# Patient Record
Sex: MALE | Race: WHITE | NOT HISPANIC OR LATINO | Employment: UNEMPLOYED | URBAN - METROPOLITAN AREA
[De-identification: names, ages, dates, MRNs, and addresses within clinical notes are randomized per-mention and may not be internally consistent; named-entity substitution may affect disease eponyms.]

---

## 2017-11-14 PROBLEM — L29.0 PRURITUS ANI: Status: ACTIVE | Noted: 2017-05-15

## 2017-11-15 PROBLEM — E34.9 TESTOSTERONE DEFICIENCY: Chronic | Status: ACTIVE | Noted: 2017-11-15

## 2018-03-31 PROBLEM — S16.1XXA ACUTE STRAIN OF NECK MUSCLE: Status: ACTIVE | Noted: 2018-03-31

## 2018-04-02 PROBLEM — G54.2 CERVICAL SYNDROME: Status: ACTIVE | Noted: 2018-04-02

## 2018-04-08 PROBLEM — I10 ESSENTIAL HYPERTENSION: Status: ACTIVE | Noted: 2018-04-08

## 2018-04-08 PROBLEM — E04.1 THYROID NODULE: Status: ACTIVE | Noted: 2018-04-08

## 2018-04-08 PROBLEM — Z12.11 SCREENING FOR COLON CANCER: Status: ACTIVE | Noted: 2018-04-08

## 2018-04-08 PROBLEM — Z12.5 SPECIAL SCREENING, PROSTATE CANCER: Status: ACTIVE | Noted: 2018-04-08

## 2018-04-08 PROBLEM — Z00.00 ANNUAL PHYSICAL EXAM: Status: ACTIVE | Noted: 2018-04-08

## 2018-04-10 PROBLEM — R73.9 ELEVATED BLOOD SUGAR: Status: ACTIVE | Noted: 2018-04-10

## 2018-04-10 PROBLEM — N41.9 PROSTATITIS: Status: ACTIVE | Noted: 2018-04-10

## 2018-05-07 PROBLEM — R73.9 ELEVATED BLOOD SUGAR: Status: RESOLVED | Noted: 2018-04-10 | Resolved: 2018-05-07

## 2018-07-09 PROBLEM — Z00.00 ANNUAL PHYSICAL EXAM: Status: RESOLVED | Noted: 2018-04-08 | Resolved: 2018-07-09

## 2018-11-26 PROBLEM — R73.01 IMPAIRED FASTING GLUCOSE: Status: ACTIVE | Noted: 2018-11-26

## 2018-11-26 PROBLEM — E78.5 HYPERLIPIDEMIA: Status: ACTIVE | Noted: 2018-11-26

## 2020-05-29 PROBLEM — E03.9 HYPOTHYROIDISM (ACQUIRED): Status: ACTIVE | Noted: 2020-05-29

## 2020-05-29 PROBLEM — E78.2 MIXED HYPERLIPIDEMIA: Status: ACTIVE | Noted: 2018-11-26

## 2021-10-05 DIAGNOSIS — I10 ESSENTIAL HYPERTENSION: Primary | ICD-10-CM

## 2021-10-05 DIAGNOSIS — E78.2 MIXED HYPERLIPIDEMIA: ICD-10-CM

## 2021-10-06 ENCOUNTER — HOSPITAL ENCOUNTER (OUTPATIENT)
Dept: CARDIOLOGY | Facility: HOSPITAL | Age: 71
Discharge: HOME | End: 2021-10-06
Attending: STUDENT IN AN ORGANIZED HEALTH CARE EDUCATION/TRAINING PROGRAM
Payer: MEDICARE

## 2021-10-06 ENCOUNTER — OFFICE VISIT (OUTPATIENT)
Dept: CARDIOLOGY | Facility: CLINIC | Age: 71
End: 2021-10-06
Payer: MEDICARE

## 2021-10-06 VITALS
RESPIRATION RATE: 16 BRPM | HEART RATE: 64 BPM | BODY MASS INDEX: 24.96 KG/M2 | OXYGEN SATURATION: 98 % | WEIGHT: 146.19 LBS | HEIGHT: 64 IN | SYSTOLIC BLOOD PRESSURE: 136 MMHG | DIASTOLIC BLOOD PRESSURE: 80 MMHG

## 2021-10-06 DIAGNOSIS — I10 ESSENTIAL HYPERTENSION: ICD-10-CM

## 2021-10-06 DIAGNOSIS — R53.83 FATIGUE, UNSPECIFIED TYPE: ICD-10-CM

## 2021-10-06 DIAGNOSIS — E78.2 MIXED HYPERLIPIDEMIA: ICD-10-CM

## 2021-10-06 PROCEDURE — 99999 PR PBB SHADOW E&M-EST. PATIENT-LVL IV: CPT | Mod: PBBFAC,,, | Performed by: STUDENT IN AN ORGANIZED HEALTH CARE EDUCATION/TRAINING PROGRAM

## 2021-10-06 PROCEDURE — 93005 ELECTROCARDIOGRAM TRACING: CPT | Mod: PO

## 2021-10-06 PROCEDURE — 99203 PR OFFICE/OUTPT VISIT, NEW, LEVL III, 30-44 MIN: ICD-10-PCS | Mod: S$PBB,,, | Performed by: STUDENT IN AN ORGANIZED HEALTH CARE EDUCATION/TRAINING PROGRAM

## 2021-10-06 PROCEDURE — 99214 OFFICE O/P EST MOD 30 MIN: CPT | Mod: PBBFAC,PO | Performed by: STUDENT IN AN ORGANIZED HEALTH CARE EDUCATION/TRAINING PROGRAM

## 2021-10-06 PROCEDURE — 99999 PR PBB SHADOW E&M-EST. PATIENT-LVL IV: ICD-10-PCS | Mod: PBBFAC,,, | Performed by: STUDENT IN AN ORGANIZED HEALTH CARE EDUCATION/TRAINING PROGRAM

## 2021-10-06 PROCEDURE — 93010 EKG 12-LEAD: ICD-10-PCS | Mod: ,,, | Performed by: INTERNAL MEDICINE

## 2021-10-06 PROCEDURE — 93010 ELECTROCARDIOGRAM REPORT: CPT | Mod: ,,, | Performed by: INTERNAL MEDICINE

## 2021-10-06 PROCEDURE — 99203 OFFICE O/P NEW LOW 30 MIN: CPT | Mod: S$PBB,,, | Performed by: STUDENT IN AN ORGANIZED HEALTH CARE EDUCATION/TRAINING PROGRAM

## 2021-10-18 ENCOUNTER — TELEPHONE (OUTPATIENT)
Dept: ENDOCRINOLOGY | Facility: CLINIC | Age: 71
End: 2021-10-18

## 2021-10-20 ENCOUNTER — OFFICE VISIT (OUTPATIENT)
Dept: ENDOCRINOLOGY | Facility: CLINIC | Age: 71
End: 2021-10-20
Payer: MEDICARE

## 2021-10-20 VITALS
OXYGEN SATURATION: 97 % | BODY MASS INDEX: 25.41 KG/M2 | SYSTOLIC BLOOD PRESSURE: 128 MMHG | RESPIRATION RATE: 18 BRPM | WEIGHT: 148.81 LBS | HEART RATE: 71 BPM | HEIGHT: 64 IN | DIASTOLIC BLOOD PRESSURE: 78 MMHG

## 2021-10-20 DIAGNOSIS — N52.9 ERECTILE DYSFUNCTION, UNSPECIFIED ERECTILE DYSFUNCTION TYPE: ICD-10-CM

## 2021-10-20 DIAGNOSIS — E04.1 THYROID NODULE: Primary | ICD-10-CM

## 2021-10-20 DIAGNOSIS — E29.1 HYPOGONADISM MALE: ICD-10-CM

## 2021-10-20 DIAGNOSIS — E34.9 TESTOSTERONE DEFICIENCY: Chronic | ICD-10-CM

## 2021-10-20 PROCEDURE — 99204 PR OFFICE/OUTPT VISIT, NEW, LEVL IV, 45-59 MIN: ICD-10-PCS | Mod: S$PBB,,, | Performed by: INTERNAL MEDICINE

## 2021-10-20 PROCEDURE — 99214 OFFICE O/P EST MOD 30 MIN: CPT | Mod: PBBFAC | Performed by: INTERNAL MEDICINE

## 2021-10-20 PROCEDURE — 99999 PR PBB SHADOW E&M-EST. PATIENT-LVL IV: CPT | Mod: PBBFAC,,, | Performed by: INTERNAL MEDICINE

## 2021-10-20 PROCEDURE — 99204 OFFICE O/P NEW MOD 45 MIN: CPT | Mod: S$PBB,,, | Performed by: INTERNAL MEDICINE

## 2021-10-20 PROCEDURE — 99999 PR PBB SHADOW E&M-EST. PATIENT-LVL IV: ICD-10-PCS | Mod: PBBFAC,,, | Performed by: INTERNAL MEDICINE

## 2021-10-20 RX ORDER — TADALAFIL 10 MG/1
10 TABLET ORAL DAILY PRN
Qty: 30 TABLET | Refills: 11 | Status: SHIPPED | OUTPATIENT
Start: 2021-10-20 | End: 2021-11-04 | Stop reason: SDUPTHER

## 2021-11-09 ENCOUNTER — OFFICE VISIT (OUTPATIENT)
Dept: OTOLARYNGOLOGY | Facility: CLINIC | Age: 71
End: 2021-11-09
Payer: MEDICARE

## 2021-11-09 VITALS — WEIGHT: 147.25 LBS | TEMPERATURE: 98 F | BODY MASS INDEX: 25.28 KG/M2

## 2021-11-09 DIAGNOSIS — J30.1 SEASONAL ALLERGIC RHINITIS DUE TO POLLEN: ICD-10-CM

## 2021-11-09 PROCEDURE — 99999 PR PBB SHADOW E&M-EST. PATIENT-LVL III: ICD-10-PCS | Mod: PBBFAC,,, | Performed by: STUDENT IN AN ORGANIZED HEALTH CARE EDUCATION/TRAINING PROGRAM

## 2021-11-09 PROCEDURE — 99203 PR OFFICE/OUTPT VISIT, NEW, LEVL III, 30-44 MIN: ICD-10-PCS | Mod: S$PBB,,, | Performed by: STUDENT IN AN ORGANIZED HEALTH CARE EDUCATION/TRAINING PROGRAM

## 2021-11-09 PROCEDURE — 99213 OFFICE O/P EST LOW 20 MIN: CPT | Mod: PBBFAC,PO | Performed by: STUDENT IN AN ORGANIZED HEALTH CARE EDUCATION/TRAINING PROGRAM

## 2021-11-09 PROCEDURE — 99203 OFFICE O/P NEW LOW 30 MIN: CPT | Mod: S$PBB,,, | Performed by: STUDENT IN AN ORGANIZED HEALTH CARE EDUCATION/TRAINING PROGRAM

## 2021-11-09 PROCEDURE — 99999 PR PBB SHADOW E&M-EST. PATIENT-LVL III: CPT | Mod: PBBFAC,,, | Performed by: STUDENT IN AN ORGANIZED HEALTH CARE EDUCATION/TRAINING PROGRAM

## 2021-11-09 RX ORDER — FLUTICASONE PROPIONATE 50 MCG
1 SPRAY, SUSPENSION (ML) NASAL DAILY
Qty: 16 G | Refills: 0 | Status: SHIPPED | OUTPATIENT
Start: 2021-11-09 | End: 2022-02-23

## 2021-11-10 ENCOUNTER — LAB VISIT (OUTPATIENT)
Dept: LAB | Facility: HOSPITAL | Age: 71
End: 2021-11-10
Attending: INTERNAL MEDICINE
Payer: MEDICARE

## 2021-11-10 DIAGNOSIS — E04.1 THYROID NODULE: ICD-10-CM

## 2021-11-10 DIAGNOSIS — E29.1 HYPOGONADISM MALE: ICD-10-CM

## 2021-11-10 PROCEDURE — 84443 ASSAY THYROID STIM HORMONE: CPT | Performed by: INTERNAL MEDICINE

## 2021-11-10 PROCEDURE — 36415 COLL VENOUS BLD VENIPUNCTURE: CPT | Mod: PO | Performed by: INTERNAL MEDICINE

## 2021-11-10 PROCEDURE — 84146 ASSAY OF PROLACTIN: CPT | Performed by: INTERNAL MEDICINE

## 2021-11-10 PROCEDURE — 84466 ASSAY OF TRANSFERRIN: CPT | Performed by: INTERNAL MEDICINE

## 2021-11-10 PROCEDURE — 84403 ASSAY OF TOTAL TESTOSTERONE: CPT | Performed by: INTERNAL MEDICINE

## 2021-11-10 PROCEDURE — 83970 ASSAY OF PARATHORMONE: CPT | Performed by: INTERNAL MEDICINE

## 2021-11-10 PROCEDURE — 83002 ASSAY OF GONADOTROPIN (LH): CPT | Performed by: INTERNAL MEDICINE

## 2021-11-11 ENCOUNTER — PATIENT MESSAGE (OUTPATIENT)
Dept: ENDOCRINOLOGY | Facility: CLINIC | Age: 71
End: 2021-11-11
Payer: MEDICARE

## 2021-11-11 LAB
IRON SERPL-MCNC: 128 UG/DL (ref 45–160)
LH SERPL-ACNC: 1.9 MIU/ML (ref 0.6–12.1)
PROLACTIN SERPL IA-MCNC: 9.6 NG/ML (ref 3.5–19.4)
PTH-INTACT SERPL-MCNC: 53.7 PG/ML (ref 9–77)
SATURATED IRON: 36 % (ref 20–50)
TESTOST SERPL-MCNC: 304 NG/DL (ref 304–1227)
TOTAL IRON BINDING CAPACITY: 357 UG/DL (ref 250–450)
TRANSFERRIN SERPL-MCNC: 241 MG/DL (ref 200–375)
TSH SERPL DL<=0.005 MIU/L-ACNC: 0.86 UIU/ML (ref 0.4–4)

## 2022-01-18 DIAGNOSIS — R06.02 SOB (SHORTNESS OF BREATH): Primary | ICD-10-CM

## 2022-01-20 ENCOUNTER — OFFICE VISIT (OUTPATIENT)
Dept: PULMONOLOGY | Facility: CLINIC | Age: 72
End: 2022-01-20
Payer: MEDICARE

## 2022-01-20 ENCOUNTER — HOSPITAL ENCOUNTER (OUTPATIENT)
Dept: RADIOLOGY | Facility: HOSPITAL | Age: 72
Discharge: HOME | End: 2022-01-20
Attending: PHYSICIAN ASSISTANT
Payer: MEDICARE

## 2022-01-20 VITALS
HEIGHT: 64 IN | OXYGEN SATURATION: 97 % | WEIGHT: 144.94 LBS | RESPIRATION RATE: 17 BRPM | BODY MASS INDEX: 24.74 KG/M2 | HEART RATE: 72 BPM | SYSTOLIC BLOOD PRESSURE: 120 MMHG | DIASTOLIC BLOOD PRESSURE: 80 MMHG

## 2022-01-20 DIAGNOSIS — R93.89 ABNORMAL CHEST X-RAY: ICD-10-CM

## 2022-01-20 DIAGNOSIS — R06.02 SOB (SHORTNESS OF BREATH): Primary | ICD-10-CM

## 2022-01-20 DIAGNOSIS — R06.02 SOB (SHORTNESS OF BREATH): ICD-10-CM

## 2022-01-20 DIAGNOSIS — J45.20 MILD INTERMITTENT ASTHMA, UNSPECIFIED WHETHER COMPLICATED: ICD-10-CM

## 2022-01-20 PROCEDURE — 99214 OFFICE O/P EST MOD 30 MIN: CPT | Mod: PBBFAC,25 | Performed by: PHYSICIAN ASSISTANT

## 2022-01-20 PROCEDURE — 71046 X-RAY EXAM CHEST 2 VIEWS: CPT | Mod: TC

## 2022-01-20 PROCEDURE — 71046 X-RAY EXAM CHEST 2 VIEWS: CPT | Mod: 26,,, | Performed by: RADIOLOGY

## 2022-01-20 PROCEDURE — 99204 OFFICE O/P NEW MOD 45 MIN: CPT | Mod: S$PBB,,, | Performed by: PHYSICIAN ASSISTANT

## 2022-01-20 PROCEDURE — 71046 XR CHEST PA AND LATERAL: ICD-10-PCS | Mod: 26,,, | Performed by: RADIOLOGY

## 2022-01-20 PROCEDURE — 99999 PR PBB SHADOW E&M-EST. PATIENT-LVL IV: ICD-10-PCS | Mod: PBBFAC,,, | Performed by: PHYSICIAN ASSISTANT

## 2022-01-20 PROCEDURE — 99999 PR PBB SHADOW E&M-EST. PATIENT-LVL IV: CPT | Mod: PBBFAC,,, | Performed by: PHYSICIAN ASSISTANT

## 2022-01-20 PROCEDURE — 99204 PR OFFICE/OUTPT VISIT, NEW, LEVL IV, 45-59 MIN: ICD-10-PCS | Mod: S$PBB,,, | Performed by: PHYSICIAN ASSISTANT

## 2022-01-20 RX ORDER — ALBUTEROL SULFATE 90 UG/1
AEROSOL, METERED RESPIRATORY (INHALATION)
COMMUNITY
Start: 2021-12-24 | End: 2022-07-06

## 2022-01-20 NOTE — PROGRESS NOTES
"Subjective:       Patient ID: Francisco Da Silva is a 71 y.o. male.    Chief Complaint: Shortness of Breath and rev cxr      72yo male presents with complaint of SOB  SOB with just walking to his car from the office  This problem started about a month ago  Also complains of sinus congestion/sinus issues, has appointment with allergist, has had sinus xray which was clear  Also complains of a dry cough, worse with laying down, also some dysphagia  Never smoker  No history of lung disease, no family history of lung disease  Occupation: "[employer] work in [employer]"   Reports getting "attacks" of SOB where he is wheezing and feels some chest tightness, unprovoked  Has albuterol inhaler, unsure if using correctly    Dyspnea Characteristics:   Exertional Dyspnea   Dyspnea Duration:  Subacute  Dyspnea Severity:  DAYANA 4  Dyspnea Timing:  daytime and  Nocturnal  Dyspnea Contributing Factors:  Sinus, allergy, gerd  Dyspnea Associated Symptoms:   Cough,  Sputum Production,  Postnasal discharge,  Wheezing and  Dysphagia     Modified Dayana Dyspnea Scale      0  Nothing at all    0.5  Very, very slight (just noticeable)    1  Very slight    2  Slight    3  Moderate    4 Somewhat severe    5 Severe      6    7  Very severe    8    9   Very, very severe (almost maximal)  10  Maximal    Immunization History   Administered Date(s) Administered    COVID-19, MRNA, LN-S, PF (Pfizer) (Purple Cap) 03/24/2021, 04/13/2021    Influenza 12/05/2016    Influenza - High Dose - PF (65 years and older) 10/25/2018, 10/31/2019    Influenza - Quadrivalent - PF *Preferred* (6 months and older) 12/18/2017    Influenza - Trivalent - PF (ADULT) 12/05/2016    Pneumococcal Conjugate - 13 Valent 03/10/2016    Pneumococcal Polysaccharide - 23 Valent 12/04/2013, 04/04/2019      Tobacco Use: Low Risk     Smoking Tobacco Use: Never Smoker    Smokeless Tobacco Use: Never Used      Past Medical History:   Diagnosis Date    SILVERIO (generalized anxiety " disorder)     Hemorrhoids     HLD (hyperlipidemia)     HTN (hypertension)     Hypothyroid     Osteoarthritis     Thyroid nodule       Current Outpatient Medications on File Prior to Visit   Medication Sig Dispense Refill    albuterol (PROVENTIL/VENTOLIN HFA) 90 mcg/actuation inhaler INHALE 1 PUFF BY MOUTH EVERY 4 HRS AS NEEDED FOR 14 DAYS      amLODIPine (NORVASC) 5 MG tablet Take 1 tablet (5 mg total) by mouth once daily. 30 tablet 0    aspirin (ECOTRIN) 81 MG EC tablet Take 81 mg by mouth once daily.      azelastine (ASTELIN) 137 mcg (0.1 %) nasal spray 1 spray (137 mcg total) by Nasal route 2 (two) times daily. 30 mL 1    cholecalciferol, vitamin D3, 250 mcg (10,000 unit) Tab Take 3 tablets by mouth once a week. 36 tablet 1    EScitalopram oxalate (LEXAPRO) 10 MG tablet Take 1 tablet (10 mg total) by mouth once daily. 90 tablet 1    fluticasone propionate (FLONASE) 50 mcg/actuation nasal spray 1 spray (50 mcg total) by Each Nostril route once daily. 16 g 0    fluticasone propionate (FLOVENT HFA) 220 mcg/actuation inhaler Inhale 1 puff into the lungs 2 (two) times daily. Controller 12 g 0    loratadine-pseudoephedrine  mg (CLARITIN-D 24-HOUR)  mg per 24 hr tablet Take 1 tablet by mouth once daily.      losartan (COZAAR) 100 MG tablet Take 1 tablet (100 mg total) by mouth once daily. 90 tablet 3    montelukast (SINGULAIR) 10 mg tablet Take 1 tablet (10 mg total) by mouth every evening. 90 tablet 1    niacin (NIASPAN) 1000 MG CR tablet Take 1 tablet (1,000 mg total) by mouth nightly. 90 tablet 1    rosuvastatin (CRESTOR) 20 MG tablet Take 1 tablet (20 mg total) by mouth once daily. 90 tablet 1    tadalafiL (CIALIS) 10 MG tablet Take 1 tablet (10 mg total) by mouth daily as needed for Erectile Dysfunction. 30 tablet 0     No current facility-administered medications on file prior to visit.        Review of Systems   Constitutional: Negative for fever, weight loss, appetite change,  "fatigue and weakness.   HENT: Positive for postnasal drip. Negative for rhinorrhea, sinus pressure, trouble swallowing and congestion.    Respiratory: Positive for cough, wheezing and dyspnea on extertion. Negative for sputum production, choking, chest tightness and shortness of breath.    Cardiovascular: Negative for chest pain and leg swelling.   Musculoskeletal: Negative for arthralgias, gait problem and joint swelling.   Gastrointestinal: Negative for nausea, vomiting and abdominal pain.   Neurological: Negative for dizziness, weakness and headaches.   All other systems reviewed and are negative.      Objective:       Vitals:    01/20/22 1110   BP: 120/80   Pulse: 72   Resp: 17   SpO2: 97%   Weight: 65.8 kg (144 lb 15.2 oz)   Height: 5' 4" (1.626 m)       Physical Exam   Constitutional: He is oriented to person, place, and time. He appears well-developed and well-nourished. No distress.   HENT:   Head: Normocephalic.   Nose: Nose normal.   Mouth/Throat: Oropharynx is clear and moist.   Cardiovascular: Normal rate and regular rhythm.   Pulmonary/Chest: Effort normal and breath sounds normal. No respiratory distress. He has no wheezes. He has no rhonchi. He has no rales.   Musculoskeletal:         General: No edema.      Cervical back: Normal range of motion and neck supple.   Lymphadenopathy: No supraclavicular adenopathy is present.     He has no cervical adenopathy.   Neurological: He is alert and oriented to person, place, and time. Gait normal.   Skin: Skin is warm and dry.   Psychiatric: He has a normal mood and affect.   Vitals reviewed.    Personal Diagnostic Review    EXAMINATION:  XR CHEST PA AND LATERAL 01/20/2022     CLINICAL HISTORY:  Shortness of breath     TECHNIQUE:  PA and lateral views of the chest were performed.     COMPARISON:  None     FINDINGS:  The lungs are clear and free of infiltrate.  No pleural effusion or pneumothorax. The heart is not enlarged. There are some reticulonodular " opacities seen within the right midlung zone anteriorly which may even be representative of calcified granulomas and are also seen on the lateral film.     Impression:     As above    Electronically signed by: Sunil Valenzuela DO  Date:                                            01/20/2022  Time:                                           11:13  I personally reviewed xray imaging and agree with radiology report. Reticulonodular opacities noted suggestive of calcified granulomas.          Assessment/Plan:       Problem List Items Addressed This Visit        Pulmonary    Mild intermittent asthma     No distress, no wheezing  Albuterol with spacer education given  Ramila, alpha 1, IgE, PFT  F/u in 3 months         Relevant Orders    Complete PFT with bronchodilator    Stress test, pulmonary    IGE    Alpha-1-Antitrypsin    ALPHA 1 ANTITRYPSIN PHENOTYPE    RAMILA Screen w/Reflex       Other    SOB (shortness of breath) - Primary     Possible asthma triggered by a URI, residual symptoms from viral URI, allergy may be contributor, recommend GERD interventions as well  See asthma plan         Relevant Orders    Complete PFT with bronchodilator    Stress test, pulmonary    IGE    Alpha-1-Antitrypsin    ALPHA 1 ANTITRYPSIN PHENOTYPE    RAMILA Screen w/Reflex    Abnormal chest x-ray     Reticulonodular opacities noted suggestive of calcified granulomas. Likely chronic. Low cancer risk. Will follow  PFT             Discussed diagnosis, its evaluation, treatment and usual course. All questions answered.    Patient verbalized understanding of plan and left in no acute distress    Thank you for the courtesy of participating in the care of this patient    Sade Luz PA-C

## 2022-01-20 NOTE — ASSESSMENT & PLAN NOTE
No distress, no wheezing  Albuterol with spacer education given  Sylvia, alpha 1, IgE, PFT  F/u in 3 months

## 2022-01-20 NOTE — ASSESSMENT & PLAN NOTE
Reticulonodular opacities noted suggestive of calcified granulomas. Likely chronic. Low cancer risk. Will follow  PFT

## 2022-01-20 NOTE — ASSESSMENT & PLAN NOTE
Possible asthma triggered by a URI, residual symptoms from viral URI, allergy may be contributor, recommend GERD interventions as well  See asthma plan

## 2022-02-23 ENCOUNTER — LAB VISIT (OUTPATIENT)
Dept: LAB | Facility: HOSPITAL | Age: 72
End: 2022-02-23
Attending: ALLERGY & IMMUNOLOGY
Payer: MEDICARE

## 2022-02-23 ENCOUNTER — OFFICE VISIT (OUTPATIENT)
Dept: ALLERGY | Facility: CLINIC | Age: 72
End: 2022-02-23
Payer: MEDICARE

## 2022-02-23 VITALS
HEART RATE: 79 BPM | HEIGHT: 64 IN | BODY MASS INDEX: 23.86 KG/M2 | DIASTOLIC BLOOD PRESSURE: 75 MMHG | SYSTOLIC BLOOD PRESSURE: 128 MMHG | TEMPERATURE: 98 F | WEIGHT: 139.75 LBS

## 2022-02-23 DIAGNOSIS — J45.40 MODERATE PERSISTENT ASTHMA WITHOUT COMPLICATION: ICD-10-CM

## 2022-02-23 DIAGNOSIS — J30.9 ALLERGIC RHINITIS, UNSPECIFIED SEASONALITY, UNSPECIFIED TRIGGER: Primary | ICD-10-CM

## 2022-02-23 LAB — IGE SERPL-ACNC: 206 IU/ML (ref 0–100)

## 2022-02-23 PROCEDURE — 99999 PR PBB SHADOW E&M-EST. PATIENT-LVL III: CPT | Mod: PBBFAC,,, | Performed by: ALLERGY & IMMUNOLOGY

## 2022-02-23 PROCEDURE — 82785 ASSAY OF IGE: CPT | Performed by: ALLERGY & IMMUNOLOGY

## 2022-02-23 PROCEDURE — 36415 COLL VENOUS BLD VENIPUNCTURE: CPT | Performed by: ALLERGY & IMMUNOLOGY

## 2022-02-23 PROCEDURE — 99213 OFFICE O/P EST LOW 20 MIN: CPT | Mod: PBBFAC | Performed by: ALLERGY & IMMUNOLOGY

## 2022-02-23 PROCEDURE — 99999 PR PBB SHADOW E&M-EST. PATIENT-LVL III: ICD-10-PCS | Mod: PBBFAC,,, | Performed by: ALLERGY & IMMUNOLOGY

## 2022-02-23 PROCEDURE — 86003 ALLG SPEC IGE CRUDE XTRC EA: CPT | Performed by: ALLERGY & IMMUNOLOGY

## 2022-02-23 PROCEDURE — 86003 ALLG SPEC IGE CRUDE XTRC EA: CPT | Mod: 59 | Performed by: ALLERGY & IMMUNOLOGY

## 2022-02-23 PROCEDURE — 99204 OFFICE O/P NEW MOD 45 MIN: CPT | Mod: S$PBB,,, | Performed by: ALLERGY & IMMUNOLOGY

## 2022-02-23 PROCEDURE — 99204 PR OFFICE/OUTPT VISIT, NEW, LEVL IV, 45-59 MIN: ICD-10-PCS | Mod: S$PBB,,, | Performed by: ALLERGY & IMMUNOLOGY

## 2022-02-23 NOTE — PROGRESS NOTES
Subjective:       Patient ID: Francisco Da Silva is a 71 y.o. male.      Referred by Sivakumar Olmos MD for evaluation of allergic rhinitis    Chief Complaint:  Allergic Rhinitis       HPI: 71 year old male-nasal congestion after a new dog. Asthma- hospital three weeks ago for asthma exacerbations- steroids.  NO nasal congestion, no itchy or watery eyes, no sneezing  Started Singulair last Summer, which he feels does not help  Allergy testing in the past- dust mites and mold- SCIT, bu the did not feel that it helped (over 50 years ago)  Trelegy- last month  Albuterol- last required- last required a week ago  Triggered-unsure            Past Medical History:   Diagnosis Date    Asthma     SILVERIO (generalized anxiety disorder)     Hemorrhoids     HLD (hyperlipidemia)     HTN (hypertension)     Hypothyroid     Osteoarthritis     Thyroid nodule      Family History   Problem Relation Age of Onset    Heart disease Mother     Stroke Father     Heart disease Brother      Current Outpatient Medications on File Prior to Visit   Medication Sig Dispense Refill    albuterol (PROVENTIL/VENTOLIN HFA) 90 mcg/actuation inhaler INHALE 1 PUFF BY MOUTH EVERY 4 HRS AS NEEDED FOR 14 DAYS      aspirin (ECOTRIN) 81 MG EC tablet Take 81 mg by mouth once daily.      cholecalciferol, vitamin D3, 250 mcg (10,000 unit) Tab Take 3 tablets by mouth once a week. 36 tablet 1    diltiaZEM (TIAZAC) 240 MG Cs24 Take 1 capsule (240 mg total) by mouth once daily. 30 capsule 2    montelukast (SINGULAIR) 10 mg tablet Take 1 tablet (10 mg total) by mouth every evening. 90 tablet 1    niacin (NIASPAN) 1000 MG CR tablet Take 1 tablet (1,000 mg total) by mouth nightly. 90 tablet 1    rosuvastatin (CRESTOR) 20 MG tablet Take 1 tablet (20 mg total) by mouth once daily. 90 tablet 1    tadalafiL (CIALIS) 10 MG tablet Take 1 tablet (10 mg total) by mouth daily as needed for Erectile Dysfunction. 30 tablet 0    TRELEGY ELLIPTA 200-62.5-25 mcg inhaler        azelastine (ASTELIN) 137 mcg (0.1 %) nasal spray 1 spray (137 mcg total) by Nasal route 2 (two) times daily. (Patient not taking: Reported on 2/23/2022) 30 mL 1    [DISCONTINUED] EScitalopram oxalate (LEXAPRO) 10 MG tablet Take 1 tablet (10 mg total) by mouth once daily. 90 tablet 1    [DISCONTINUED] fluticasone propionate (FLONASE) 50 mcg/actuation nasal spray 1 spray (50 mcg total) by Each Nostril route once daily. 16 g 0    [DISCONTINUED] fluticasone propionate (FLOVENT HFA) 220 mcg/actuation inhaler Inhale 1 puff into the lungs 2 (two) times daily. Controller 12 g 0    [DISCONTINUED] hydrocodone-chlorpheniramine (TUSSIONEX) 10-8 mg/5 mL suspension Take 5 mLs by mouth every 12 (twelve) hours as needed for Cough. 473 mL 0    [DISCONTINUED] levoFLOXacin (LEVAQUIN) 750 MG tablet       [DISCONTINUED] loratadine-pseudoephedrine  mg (CLARITIN-D 24-HOUR)  mg per 24 hr tablet Take 1 tablet by mouth once daily.      [DISCONTINUED] losartan (COZAAR) 100 MG tablet Take 1 tablet (100 mg total) by mouth once daily. 90 tablet 3     No current facility-administered medications on file prior to visit.       Review of patient's allergies indicates:   Allergen Reactions    [place] thyroid [thyroid (pork)]      cough    Cozaar [losartan]        Environmental History: Pets in the home: dogs (1). He has chickens and ducks. He does not smoke. NO tobacco smoke exposure.  Review of Systems   Constitutional: Negative for chills and fever.   HENT: Positive for congestion. Negative for rhinorrhea.    Eyes: Negative for discharge and itching.   Respiratory: Negative for cough, shortness of breath and wheezing.    Cardiovascular: Negative for chest pain and leg swelling.   Gastrointestinal: Negative for nausea and vomiting.   Endocrine: Negative for cold intolerance and heat intolerance.   Skin: Negative for rash and wound.   Allergic/Immunologic: Positive for environmental allergies. Negative for food allergies.    Neurological: Negative for facial asymmetry and speech difficulty.   Hematological: Negative for adenopathy. Does not bruise/bleed easily.   Psychiatric/Behavioral: Negative for behavioral problems and suicidal ideas.        Objective:    Physical Exam  Vitals reviewed.   Constitutional:       General: He is not in acute distress.     Appearance: Normal appearance. He is not ill-appearing, toxic-appearing or diaphoretic.   HENT:      Head: Normocephalic and atraumatic.      Right Ear: Tympanic membrane, ear canal and external ear normal. There is no impacted cerumen.      Left Ear: Tympanic membrane, ear canal and external ear normal. There is no impacted cerumen.      Nose: Nose normal. No congestion or rhinorrhea.      Mouth/Throat:      Mouth: Mucous membranes are moist.      Pharynx: No oropharyngeal exudate or posterior oropharyngeal erythema.   Eyes:      General: No scleral icterus.        Right eye: No discharge.         Left eye: No discharge.      Pupils: Pupils are equal, round, and reactive to light.   Neck:      Vascular: No carotid bruit.   Cardiovascular:      Rate and Rhythm: Normal rate and regular rhythm.      Heart sounds: Normal heart sounds. No murmur heard.    No friction rub. No gallop.   Pulmonary:      Effort: Pulmonary effort is normal. No respiratory distress.      Breath sounds: Normal breath sounds. No stridor. No wheezing, rhonchi or rales.   Chest:      Chest wall: No tenderness.   Abdominal:      General: There is no distension.      Palpations: There is no mass.      Tenderness: There is no abdominal tenderness. There is no guarding or rebound.      Hernia: No hernia is present.   Musculoskeletal:         General: No swelling, tenderness, deformity or signs of injury. Normal range of motion.      Cervical back: Normal range of motion and neck supple. No rigidity or tenderness.      Right lower leg: No edema.      Left lower leg: No edema.   Lymphadenopathy:      Cervical: No  cervical adenopathy.   Skin:     General: Skin is warm.      Coloration: Skin is not jaundiced.      Findings: No lesion.   Neurological:      General: No focal deficit present.      Mental Status: He is alert and oriented to person, place, and time.      Gait: Gait normal.   Psychiatric:         Mood and Affect: Mood normal.         Behavior: Behavior normal.         Thought Content: Thought content normal.         Judgment: Judgment normal.           Assessment:       1. Allergic rhinitis, unspecified seasonality, unspecified trigger    2. Moderate persistent asthma without complication         Plan:       Allergic rhinitis, unspecified seasonality, unspecified trigger    Moderate persistent asthma without complication  -     IgE; Future; Expected date: 02/23/2022  -     [place] grass IgE; Future; Expected date: 02/23/2022  -     Aspergillus fumagatus IgE; Future; Expected date: 02/23/2022  -     Chaetomium globosum IgE; Future; Expected date: 02/23/2022  -     Cockroach, American IgE; Future; Expected date: 02/23/2022  -     Cladosporium IgE; Future; Expected date: 02/23/2022  -     Curvularia lunata IgE; Future; Expected date: 02/23/2022  -     D. farinae IgE; Future; Expected date: 02/23/2022  -     D. pteronyssinus IgE; Future; Expected date: 02/23/2022  -     Dog dander IgE; Future; Expected date: 02/23/2022  -     Plantain, English IgE; Future; Expected date: 02/23/2022  -     Eucalyptus IgE; Future; Expected date: 02/23/2022  -     [place] elder, rough IgE; Future; Expected date: 02/23/2022  -     Mugwort IgE; Future; Expected date: 02/23/2022  -     Nettle IgE; Future; Expected date: 02/23/2022  -     Orchard grass IgE; Future; Expected date: 02/23/2022  -     [place], western white IgE; Future; Expected date: 02/23/2022  -     [place], common IgE; Future; Expected date: 02/23/2022  -     Ragweed, short, common IgE; Future; Expected date: 02/23/2022  -     Red top grass IgE; Future; Expected date: 02/23/2022  -      Rye grass, cultivated IgE; Future; Expected date: 02/23/2022  -     Thistle, Russian IgE; Future; Expected date: 02/23/2022  -     Stemphyllium IgE; Future; Expected date: 02/23/2022  -     Dion IgE; Future; Expected date: 02/23/2022  -     Orestes grass IgE; Future; Expected date: 02/23/2022  -     Allergen, Pecan Tree IgE; Future; Expected date: 02/23/2022  -     [place], black IgE; Future; Expected date: 02/23/2022  -     [place], bald IgE; Future; Expected date: 02/23/2022  -     Oak, white IgE; Future; Expected date: 02/23/2022  -     Allergen, [place]; Future; Expected date: 02/23/2022  -     Cat epithelium IgE; Future; Expected date: 02/23/2022  -     Allergen, [place] Celtis; Future; Expected date: 02/23/2022  -     Allergen, Elm Cedar; Future; Expected date: 02/23/2022  -     Allergen-[place]; Future; Expected date: 02/23/2022  -     RAST Allergen for [place]; Future; Expected date: 02/23/2022  -     RAST Allergen [place] ([place]); Future; Expected date: 02/23/2022  -     Allergen, [place] ([facility]); Future; Expected date: 02/23/2022  -     Allergen-[place]; Future; Expected date: 02/23/2022  -     RAST Allergen [place]; Future; Expected date: 02/23/2022  -     RAST Allergen, Sheep [place]); Future; Expected date: 02/23/2022  -     Allergen-Alternaria Alternata; Future; Expected date: 02/23/2022  -     Allergen-[place]; Future; Expected date: 02/23/2022  -     Allergen, [place]; Future; Expected date: 02/23/2022  -     Streptococcus pneumoniae IgG Antibody (23 Serotypes), MAID; Future; Expected date: 02/23/2022    Agree with current treatment regimen.  He reports being asymptomatic, thus will not add any medications.    RTC 4-6 weeks or sooner, if needed.    JOHNNY PALACIOS spent a total of 45 minutes on the day of the visit.  This includes face to face time and non-face to face time preparing to see the patient (eg, review of tests), obtaining  and/or reviewing separately obtained history, documenting clinical information in the electronic or other health record, independently interpreting results and communicating results to the patient/family/caregiver, or care coordinator.    CC: Dr. Olmos

## 2022-02-25 LAB — AMER SYCAMORE IGE QN: <0.35 KU/L

## 2022-02-28 LAB
A ALTERNATA IGE QN: <0.1 KU/L
A FUMIGATUS IGE QN: <0.1 KU/L
ALLERGEN BOXELDER MAPLE TREE IGE: <0.1 KU/L
ALLERGEN CHAETOMIUM GLOBOSUM IGE: <0.1 KU/L
ALLERGEN MAPLE (BOX ELDER) CLASS: NORMAL
ALLERGEN MULBERRY CLASS: NORMAL
ALLERGEN MULBERRY TREE IGE: <0.1 KU/L
ALLERGEN WHITE ASH TREE IGE: <0.1 KU/L
ALLERGEN WHITE PINE TREE IGE: <0.1 KU/L
BAHIA GRASS IGE QN: <0.1 KU/L
BALD CYPRESS IGE QN: <0.1 KU/L
C HERBARUM IGE QN: <0.1 KU/L
C LUNATA IGE QN: <0.1 KU/L
CAT DANDER IGE QN: <0.1 KU/L
CHAETOMIUM GLOB. CLASS: NORMAL
COCKLEBUR IGE QN: <0.1 KU/L
COCKSFOOT IGE QN: <0.1 KU/L
COMMON RAGWEED IGE QN: <0.1 KU/L
COTTONWOOD IGE QN: <0.1 KU/L
D FARINAE IGE QN: <0.1 KU/L
D PTERONYSS IGE QN: <0.1 KU/L
DEPRECATED A ALTERNATA IGE RAST QL: NORMAL
DEPRECATED A FUMIGATUS IGE RAST QL: NORMAL
DEPRECATED BAHIA GRASS IGE RAST QL: NORMAL
DEPRECATED BALD CYPRESS IGE RAST QL: NORMAL
DEPRECATED C HERBARUM IGE RAST QL: NORMAL
DEPRECATED C LUNATA IGE RAST QL: NORMAL
DEPRECATED CAT DANDER IGE RAST QL: NORMAL
DEPRECATED COCKLEBUR IGE RAST QL: NORMAL
DEPRECATED COCKSFOOT IGE RAST QL: NORMAL
DEPRECATED COMMON RAGWEED IGE RAST QL: NORMAL
DEPRECATED COTTONWOOD IGE RAST QL: NORMAL
DEPRECATED D FARINAE IGE RAST QL: NORMAL
DEPRECATED D PTERONYSS IGE RAST QL: NORMAL
DEPRECATED DOG DANDER IGE RAST QL: NORMAL
DEPRECATED ELDER IGE RAST QL: NORMAL
DEPRECATED ENGL PLANTAIN IGE RAST QL: NORMAL
DEPRECATED GUM-TREE IGE RAST QL: NORMAL
DEPRECATED HACKBERRY TREE IGE RAST QL: NORMAL
DEPRECATED JOHNSON GRASS IGE RAST QL: NORMAL
DEPRECATED KENT BLUE GRASS IGE RAST QL: NORMAL
DEPRECATED LONDON PLANE IGE RAST QL: NORMAL
DEPRECATED MUGWORT IGE RAST QL: NORMAL
DEPRECATED NETTLE IGE RAST QL: NORMAL
DEPRECATED PECAN/HICK TREE IGE RAST QL: NORMAL
DEPRECATED PER RYE GRASS IGE RAST QL: NORMAL
DEPRECATED PRIVET IGE RAST QL: NORMAL
DEPRECATED RED TOP GRASS IGE RAST QL: NORMAL
DEPRECATED ROACH IGE RAST QL: NORMAL
DEPRECATED SALTWORT IGE RAST QL: NORMAL
DEPRECATED SHEEP SORREL IGE RAST QL: NORMAL
DEPRECATED SILVER BIRCH IGE RAST QL: NORMAL
DEPRECATED TIMOTHY IGE RAST QL: NORMAL
DEPRECATED WHITE OAK IGE RAST QL: NORMAL
DEPRECATED WILLOW IGE RAST QL: NORMAL
DOG DANDER IGE QN: <0.1 KU/L
ELDER IGE QN: <0.1 KU/L
ELM CEDAR CLASS: NORMAL
ELM CEDAR, IGE: <0.1 KU/L
ENGL PLANTAIN IGE QN: <0.1 KU/L
GUM-TREE IGE QN: <0.1 KU/L
HACKBERRY TREE IGE QN: <0.1 KU/L
JOHNSON GRASS IGE QN: <0.1 KU/L
KENT BLUE GRASS IGE QN: <0.1 KU/L
LONDON PLANE IGE QN: <0.1 KU/L
MUGWORT IGE QN: <0.1 KU/L
NETTLE IGE QN: <0.1 KU/L
PECAN/HICK TREE IGE QN: <0.1 KU/L
PER RYE GRASS IGE QN: <0.1 KU/L
PRIVET IGE QN: <0.1 KU/L
RED TOP GRASS IGE QN: <0.1 KU/L
ROACH IGE QN: <0.1 KU/L
S PNEUM DA 1 IGG SER-MCNC: 9.7 MCG/ML
S PNEUM DA 10A IGG SER-MCNC: 1.6 MCG/ML
S PNEUM DA 11A IGG SER-MCNC: 1.5 MCG/ML
S PNEUM DA 12F IGG SER-MCNC: 1 MCG/ML
S PNEUM DA 14 IGG SER-MCNC: 10.4 MCG/ML
S PNEUM DA 15B IGG SER-MCNC: 5.6 MCG/ML
S PNEUM DA 17F IGG SER-MCNC: 32.1 MCG/ML
S PNEUM DA 18C IGG SER-MCNC: 0.9 MCG/ML
S PNEUM DA 19A IGG SER-MCNC: 26.8 MCG/ML
S PNEUM DA 2 IGG SER-MCNC: 5 MCG/ML
S PNEUM DA 20A IGG SER-MCNC: 4.3 MCG/ML
S PNEUM DA 22F IGG SER-MCNC: 5.7 MCG/ML
S PNEUM DA 23F IGG SER-MCNC: 7.1 MCG/ML
S PNEUM DA 3 IGG SER-MCNC: 2.2 MCG/ML
S PNEUM DA 33F IGG SER-MCNC: 4.4 MCG/ML
S PNEUM DA 4 IGG SER-MCNC: 0.6 MCG/ML
S PNEUM DA 5 IGG SER-MCNC: 4 MCG/ML
S PNEUM DA 6B IGG SER-MCNC: 3.1 MCG/ML
S PNEUM DA 7F IGG SER-MCNC: 6.8 MCG/ML
S PNEUM DA 8 IGG SER-MCNC: 6 MCG/ML
S PNEUM DA 9N IGG SER-MCNC: NORMAL MCG/ML
S PNEUM DA 9V IGG SER-MCNC: 7 MCG/ML
S.PNEUMONIAE TYPE 19F: 16.5 MCG/ML
SALTWORT IGE QN: <0.1 KU/L
SHEEP SORREL IGE QN: <0.1 KU/L
SILVER BIRCH IGE QN: <0.1 KU/L
STEMPHYLIUM HERBARUM CLASS: NORMAL
STEMPHYLLIUM, IGE: <0.1 KU/L
TIMOTHY IGE QN: <0.1 KU/L
WHITE ASH CLASS: NORMAL
WHITE OAK IGE QN: <0.1 KU/L
WHITE PINE CLASS: NORMAL
WILLOW IGE QN: <0.1 KU/L

## 2022-04-21 ENCOUNTER — TELEPHONE (OUTPATIENT)
Dept: PULMONOLOGY | Facility: CLINIC | Age: 72
End: 2022-04-21
Payer: MEDICARE

## 2022-04-21 NOTE — TELEPHONE ENCOUNTER
Patient is no call/no show for appointment today. Spoke with patient and he stated he canceled his appointments today on mychart. Spoke with patient about rescheduling and he stated that he didn't need/want to reschedule his appointments. Patient also stated that he is seeing a new doctor since being hospitalized. Patient also had appointment with Natty at 2:40 today.Will notify Natty; PA and staff.

## 2023-05-05 PROBLEM — J30.9 ALLERGIC RHINITIS: Status: ACTIVE | Noted: 2023-05-02

## 2023-05-05 PROBLEM — J45.40 MODERATE PERSISTENT ALLERGIC ASTHMA: Status: ACTIVE | Noted: 2023-02-08

## 2023-08-03 ENCOUNTER — PATIENT MESSAGE (OUTPATIENT)
Dept: RESEARCH | Facility: HOSPITAL | Age: 73
End: 2023-08-03
Payer: MEDICARE

## 2024-01-30 PROBLEM — J45.51 SEVERE PERSISTENT ASTHMA WITH (ACUTE) EXACERBATION: Status: ACTIVE | Noted: 2024-01-30

## 2024-01-30 PROBLEM — Z90.49 HISTORY OF APPENDECTOMY: Status: ACTIVE | Noted: 2024-01-30

## 2024-03-11 ENCOUNTER — TELEPHONE (OUTPATIENT)
Dept: UROLOGY | Facility: CLINIC | Age: 74
End: 2024-03-11
Payer: MEDICARE

## 2024-03-11 NOTE — TELEPHONE ENCOUNTER
Called patient and after verifying name and date of birth informed patient that this first visist Dr. Ruiz would order tests/labs after the visit. Patient voiced understanding.    Kristen Edmonds LPN    ----- Message from Alan Gutiérrez sent at 3/6/2024 11:24 AM CST -----  Contact: self  Pt is asking for an return call in reference to having orders put in to have psa test done before apt on 05/02, please call back at .[phone] Thx CJ     abd pain

## 2024-05-02 ENCOUNTER — LAB VISIT (OUTPATIENT)
Dept: LAB | Facility: HOSPITAL | Age: 74
End: 2024-05-02
Attending: UROLOGY
Payer: MEDICARE

## 2024-05-02 ENCOUNTER — OFFICE VISIT (OUTPATIENT)
Dept: UROLOGY | Facility: CLINIC | Age: 74
End: 2024-05-02
Payer: MEDICARE

## 2024-05-02 VITALS
RESPIRATION RATE: 18 BRPM | HEART RATE: 61 BPM | WEIGHT: 139.56 LBS | HEIGHT: 64 IN | SYSTOLIC BLOOD PRESSURE: 166 MMHG | DIASTOLIC BLOOD PRESSURE: 81 MMHG | BODY MASS INDEX: 23.82 KG/M2

## 2024-05-02 DIAGNOSIS — R97.20 ELEVATED PSA: ICD-10-CM

## 2024-05-02 DIAGNOSIS — N13.8 ENLARGED PROSTATE WITH URINARY OBSTRUCTION: ICD-10-CM

## 2024-05-02 DIAGNOSIS — R39.9 LOWER URINARY TRACT SYMPTOMS (LUTS): ICD-10-CM

## 2024-05-02 DIAGNOSIS — E29.1 MALE HYPOGONADISM: ICD-10-CM

## 2024-05-02 DIAGNOSIS — N40.1 ENLARGED PROSTATE WITH URINARY OBSTRUCTION: ICD-10-CM

## 2024-05-02 DIAGNOSIS — R97.20 ELEVATED PSA: Primary | ICD-10-CM

## 2024-05-02 DIAGNOSIS — N52.8 OTHER MALE ERECTILE DYSFUNCTION: ICD-10-CM

## 2024-05-02 DIAGNOSIS — R35.1 NOCTURIA: ICD-10-CM

## 2024-05-02 LAB
BILIRUB UR QL STRIP: NEGATIVE
CREAT SERPL-MCNC: 0.7 MG/DL (ref 0.5–1.4)
EST. GFR  (NO RACE VARIABLE): >60 ML/MIN/1.73 M^2
GLUCOSE UR QL STRIP: NEGATIVE
KETONES UR QL STRIP: NEGATIVE
LEUKOCYTE ESTERASE UR QL STRIP: NEGATIVE
PH, POC UA: 8.5
POC BLOOD, URINE: NEGATIVE
POC NITRATES, URINE: NEGATIVE
POC RESIDUAL URINE VOLUME: 114 ML (ref 0–100)
PROSTATE SPECIFIC ANTIGEN, TOTAL: 5.8 NG/ML (ref 0–4)
PROT UR QL STRIP: NEGATIVE
PSA FREE MFR SERPL: 36.9 %
PSA FREE SERPL-MCNC: 2.14 NG/ML (ref 0–1.5)
SP GR UR STRIP: 1.01 (ref 1–1.03)
UROBILINOGEN UR STRIP-ACNC: 0.2 (ref 0.3–2.2)

## 2024-05-02 PROCEDURE — 99214 OFFICE O/P EST MOD 30 MIN: CPT | Mod: PBBFAC,PN,25 | Performed by: UROLOGY

## 2024-05-02 PROCEDURE — 82565 ASSAY OF CREATININE: CPT | Performed by: UROLOGY

## 2024-05-02 PROCEDURE — 36415 COLL VENOUS BLD VENIPUNCTURE: CPT | Mod: PN | Performed by: UROLOGY

## 2024-05-02 PROCEDURE — 84154 ASSAY OF PSA FREE: CPT | Performed by: UROLOGY

## 2024-05-02 PROCEDURE — 84403 ASSAY OF TOTAL TESTOSTERONE: CPT | Performed by: UROLOGY

## 2024-05-02 PROCEDURE — 99999 PR PBB SHADOW E&M-EST. PATIENT-LVL IV: CPT | Mod: PBBFAC,,, | Performed by: UROLOGY

## 2024-05-02 PROCEDURE — 99214 OFFICE O/P EST MOD 30 MIN: CPT | Mod: S$PBB,,, | Performed by: UROLOGY

## 2024-05-02 PROCEDURE — 99999PBSHW POCT BLADDER SCAN: Mod: PBBFAC,,,

## 2024-05-02 PROCEDURE — 99999PBSHW POCT URINALYSIS, DIPSTICK, AUTOMATED, W/O SCOPE: Mod: PBBFAC,,,

## 2024-05-02 PROCEDURE — 81003 URINALYSIS AUTO W/O SCOPE: CPT | Mod: PBBFAC,PN | Performed by: UROLOGY

## 2024-05-02 PROCEDURE — 51798 US URINE CAPACITY MEASURE: CPT | Mod: PBBFAC,PN | Performed by: UROLOGY

## 2024-05-02 RX ORDER — TADALAFIL 20 MG/1
20 TABLET ORAL ONCE AS NEEDED
Qty: 10 TABLET | Refills: 6 | Status: SHIPPED | OUTPATIENT
Start: 2024-05-02

## 2024-05-02 RX ORDER — ALFUZOSIN HYDROCHLORIDE 10 MG/1
10 TABLET, EXTENDED RELEASE ORAL NIGHTLY
Qty: 90 TABLET | Refills: 1 | Status: SHIPPED | OUTPATIENT
Start: 2024-05-02 | End: 2025-05-02

## 2024-05-02 NOTE — PROGRESS NOTES
Subjective:       Patient ID: Francisco Da Silva is a 74 y.o. male.    Chief Complaint: New Patient Visit      History of Present Illness:     Mr Da Silva has an elevated PSA.  His urine ExoDx was low at 4.38 on 6-10-23.  His prostate MRI on 4-24-23 showed no focal suspicious lesion in the prostate gland.  PIRADS 2.  Prostate volume is 61.46 cc.  He has a history of a prostate biopsy on 6-7-18 that showed benign prostate tissue.    He has an enlarged prostate and he says he has been off tamsulosin for a few months.  He was changed from alfuzosin to tamsulosin on 2-15-23.  Moderate to normal urinary flow.  Occasional feelings of incomplete bladder emtpyhing.  Occasional straining to void.  He has an intermittent urinary flow.  Nocturia X 3.    He has hypogonadism.  He has not been on TRT since October 2020.  Testosterone 200 mg IM every 2 weeks was stopped in October 2020 due to his elevated PSA.  His testosterone was 158 on 7-10-20 while being on 2 packets of androgel 1% daily for 8 weeks.  He has ED.  He had problems with headaches with Viagra in the past.  He is interested in taking tadalafil.         Past Medical History:   Diagnosis Date    Asthma     SILVERIO (generalized anxiety disorder)     Hemorrhoids     HLD (hyperlipidemia)     HTN (hypertension)     Hypothyroid     Osteoarthritis     Thyroid nodule      Family History   Problem Relation Name Age of Onset    Heart disease Mother      Stroke Father      Breast cancer Sister      Heart disease Brother       Social History     Socioeconomic History    Marital status:    Tobacco Use    Smoking status: Never    Smokeless tobacco: Never   Substance and Sexual Activity    Alcohol use: Yes     Alcohol/week: 2.0 standard drinks of alcohol     Types: 2 Cans of beer per week    Drug use: No    Sexual activity: Yes     Partners: Female     Outpatient Encounter Medications as of 5/2/2024   Medication Sig Dispense Refill    alfuzosin (UROXATRAL) 10 mg Tb24 Take 10 mg by  "mouth every evening.      aspirin (ECOTRIN) 81 MG EC tablet Take 81 mg by mouth once daily.      ciclopirox (PENLAC) 8 % Soln Apply topically nightly. 3 each 1    diltiaZEM (TIAZAC) 240 MG Cs24 TAKE 1 CAPSULE DAILY 90 capsule 3    montelukast (SINGULAIR) 10 mg tablet TAKE 1 TABLET EVERY EVENING 90 tablet 3    niacin (NIASPAN) 1000 MG CR tablet Take 1 tablet (1,000 mg total) by mouth every evening. 90 tablet 3    rosuvastatin (CRESTOR) 10 MG tablet Take 1 tablet (10 mg total) by mouth every evening. 90 tablet 0    tamsulosin (FLOMAX) 0.4 mg Cap       TRELEGY ELLIPTA 200-62.5-25 mcg inhaler       alfuzosin (UROXATRAL) 10 mg Tb24 Take 1 tablet (10 mg total) by mouth every evening. 90 tablet 1    tadalafiL (CIALIS) 20 MG Tab Take 1 tablet (20 mg total) by mouth 1 (one) time if needed (Take either 1/2 tablet or 1 tablet by mouth as needed 2 to 10 hour prior to sexual activity. Take the lowest effective dose.). 10 tablet 6     No facility-administered encounter medications on file as of 5/2/2024.        Review of Systems   Constitutional:  Negative for chills and fever.   Respiratory:  Negative for shortness of breath.    Cardiovascular:  Negative for chest pain.   Gastrointestinal:  Negative for nausea and vomiting.   Genitourinary:  Positive for urgency. Negative for hematuria.   Musculoskeletal:  Negative for back pain.   Skin:  Negative for rash.   Neurological:  Negative for dizziness.   Psychiatric/Behavioral:  Negative for agitation.        Objective:     BP (!) 166/81   Pulse 61   Resp 18   Ht 5' 4" (1.626 m)   Wt 63.3 kg (139 lb 8.8 oz)   BMI 23.95 kg/m²     Physical Exam  Constitutional:       Appearance: Normal appearance.   Pulmonary:      Effort: Pulmonary effort is normal.   Abdominal:      Palpations: Abdomen is soft.   Genitourinary:     Comments: Prostate exam deferred  Neurological:      Mental Status: He is alert and oriented to person, place, and time.   Psychiatric:         Mood and Affect: Mood " normal.         Office Visit on 05/02/2024   Component Date Value Ref Range Status    POC Blood, Urine 05/02/2024 Negative  Negative Final    POC Bilirubin, Urine 05/02/2024 Negative  Negative Final    POC Urobilinogen, Urine 05/02/2024 0.2 (A)  0.3 - 2.2 Final    POC Ketones, Urine 05/02/2024 Negative  Negative Final    POC Protein, Urine 05/02/2024 Negative  Negative Final    POC Nitrates, Urine 05/02/2024 Negative  Negative Final    POC Glucose, Urine 05/02/2024 Negative  Negative Final    pH, UA 05/02/2024 8.5   Final    POC Specific Gravity, Urine 05/02/2024 1.015  1.003 - 1.029 Final    POC Leukocytes, Urine 05/02/2024 Negative  Negative Final    POC Residual Urine Volume 05/02/2024 114 (A)  0 - 100 mL Final        Results for orders placed or performed in visit on 05/02/24 (from the past 8760 hour(s))   POCT Bladder Scan   Result Value    POC Residual Urine Volume 114 (A)        Assessment:       1. Elevated PSA    2. Lower urinary tract symptoms (LUTS)    3. Enlarged prostate with urinary obstruction    4. Nocturia    5. Male hypogonadism    6. Other male erectile dysfunction      Plan:     Orders Placed This Encounter    PSA, Total and Free    TESTOSTERONE    CREATININE, SERUM    POCT Urinalysis, Dipstick, Automated, W/O Scope    POCT Bladder Scan    alfuzosin (UROXATRAL) 10 mg Tb24    tadalafiL (CIALIS) 20 MG Tab          4-24-23  Prostate MRI.  BRG.  See report.  No focal suspicious lesion in the prostate gland.  PIRADS 2.  Prostate volume is 61.46 cc.  No pelvic lymphadenopathy.    10-26-20  Prostate MRI.  BRG.  See report.  PIRADS 2.  Prostate size is 50.02 cc.    10-3-19  Prostate MRI.  BRG.  See report.  PIRADS 2.  Prostate size is 46.68.    I reviewed all of the above imaging results.         10-26-23  PSA 6.9.  Free % PSA 12.9.    5-17-23  PSA 6.24.  Free % PSA 15.5.    3-24-23  PSA 5.75.  Free % PSA 14.3    2-5-21  PSA 3.4    10-9-20  PSA 5.5.  Free % PSA 19.6.    10-26-23  Total testosterone  258    6-10-23  Urine ExoDx.  4.38.    I reviewed all of the above lab results.         Assessment:  - Elevated PSA.  Urine ExoDx was low at 4.38 on 6-10-23.  - Prostate MRI on 4-24-23 showed no focal suspicious lesion in the prostate gland.  PIRADS 2.  Prostate volume is 61.46 cc.   - History of a prostate biopsy on 6-7-18.  Path:  Benign prostate tissue.  - BPH with LUTS.  He says he has been off tamsulosin for a few months.  He was changed from alfuzosin to tamsulosin on 2-15-23.  - Nocturia.  - Hypogonadism.  He has not been on TRT since October 2020.  Testosterone 200 mg IM every 2 weeks was stopped in October 2020 due to his elevated PSA.  His testosterone was 158 on 7-10-20 while being on 2 packets of androgel 1% daily for 8 weeks.  - ED.  He had problems with headaches with Viagra in the past.    Plan:  - PSA, total testosterone, and creatinine today.  - The mutual decision was made to put him back on alfuzosin.  - I prescribed him alfuzosin 10 mg 1 PO qhs today on 5-2-24.  - I prescribed him tadalafil 20 mg, disp #10, 6 refills to [facility] in [place] today on 5-2-24 with detailed verbal instructions.  - I discussed dietary modifications with him today and I recommended he drink mostly water during the day.  - I recommended he limit his fluid intake at night to small amounts of water and to void just prior to bedtime.   - RTC in 6 months with a PVR on arrival or sooner as needed.

## 2024-05-03 DIAGNOSIS — R97.20 ELEVATED PSA: Primary | ICD-10-CM

## 2024-05-03 LAB — TESTOST SERPL-MCNC: 486 NG/DL (ref 304–1227)

## 2024-06-28 PROBLEM — J45.51 SEVERE PERSISTENT ASTHMA WITH (ACUTE) EXACERBATION: Status: RESOLVED | Noted: 2024-01-30 | Resolved: 2024-06-28

## 2024-10-31 ENCOUNTER — LAB VISIT (OUTPATIENT)
Dept: LAB | Facility: HOSPITAL | Age: 74
End: 2024-10-31
Attending: UROLOGY
Payer: MEDICARE

## 2024-10-31 ENCOUNTER — OFFICE VISIT (OUTPATIENT)
Dept: UROLOGY | Facility: CLINIC | Age: 74
End: 2024-10-31
Payer: MEDICARE

## 2024-10-31 VITALS
SYSTOLIC BLOOD PRESSURE: 135 MMHG | BODY MASS INDEX: 24.81 KG/M2 | HEART RATE: 67 BPM | TEMPERATURE: 98 F | DIASTOLIC BLOOD PRESSURE: 66 MMHG | RESPIRATION RATE: 18 BRPM | WEIGHT: 145.31 LBS | HEIGHT: 64 IN

## 2024-10-31 DIAGNOSIS — N40.1 HYPERPLASIA OF PROSTATE WITH LOWER URINARY TRACT SYMPTOMS (LUTS): ICD-10-CM

## 2024-10-31 DIAGNOSIS — R97.20 ELEVATED PSA: ICD-10-CM

## 2024-10-31 DIAGNOSIS — N52.8 OTHER MALE ERECTILE DYSFUNCTION: ICD-10-CM

## 2024-10-31 DIAGNOSIS — R97.20 ELEVATED PSA: Primary | ICD-10-CM

## 2024-10-31 DIAGNOSIS — R35.1 NOCTURIA: ICD-10-CM

## 2024-10-31 LAB
BILIRUB UR QL STRIP: NEGATIVE
GLUCOSE UR QL STRIP: NEGATIVE
KETONES UR QL STRIP: NEGATIVE
LEUKOCYTE ESTERASE UR QL STRIP: NEGATIVE
PH, POC UA: 6
POC BLOOD, URINE: NEGATIVE
POC NITRATES, URINE: NEGATIVE
PROSTATE SPECIFIC ANTIGEN, TOTAL: 4.6 NG/ML (ref 0–4)
PROT UR QL STRIP: NEGATIVE
PSA FREE MFR SERPL: 20 %
PSA FREE SERPL-MCNC: 0.92 NG/ML (ref 0–1.5)
SP GR UR STRIP: 1.02 (ref 1–1.03)
UROBILINOGEN UR STRIP-ACNC: 0.2 (ref 0.3–2.2)

## 2024-10-31 PROCEDURE — 99213 OFFICE O/P EST LOW 20 MIN: CPT | Mod: PBBFAC,PN | Performed by: UROLOGY

## 2024-10-31 PROCEDURE — 99999 PR PBB SHADOW E&M-EST. PATIENT-LVL III: CPT | Mod: PBBFAC,,, | Performed by: UROLOGY

## 2024-10-31 PROCEDURE — 81003 URINALYSIS AUTO W/O SCOPE: CPT | Mod: PBBFAC,PN | Performed by: UROLOGY

## 2024-10-31 PROCEDURE — 36415 COLL VENOUS BLD VENIPUNCTURE: CPT | Mod: PN | Performed by: UROLOGY

## 2024-10-31 PROCEDURE — 99999PBSHW POCT URINALYSIS, DIPSTICK, AUTOMATED, W/O SCOPE: Mod: PBBFAC,,,

## 2024-10-31 PROCEDURE — 99214 OFFICE O/P EST MOD 30 MIN: CPT | Mod: S$PBB,,, | Performed by: UROLOGY

## 2024-10-31 PROCEDURE — 84153 ASSAY OF PSA TOTAL: CPT | Performed by: UROLOGY

## 2024-10-31 RX ORDER — VARDENAFIL HYDROCHLORIDE 20 MG/1
20 TABLET ORAL SEE ADMIN INSTRUCTIONS
Qty: 6 TABLET | Refills: 6 | Status: SHIPPED | OUTPATIENT
Start: 2024-10-31 | End: 2025-10-31

## 2024-11-01 ENCOUNTER — TELEPHONE (OUTPATIENT)
Dept: UROLOGY | Facility: CLINIC | Age: 74
End: 2024-11-01
Payer: MEDICARE

## 2024-11-01 DIAGNOSIS — R97.20 ELEVATED PSA: Primary | ICD-10-CM

## 2025-01-02 ENCOUNTER — OFFICE VISIT (OUTPATIENT)
Dept: UROLOGY | Facility: CLINIC | Age: 75
End: 2025-01-02
Payer: MEDICARE

## 2025-01-02 VITALS
DIASTOLIC BLOOD PRESSURE: 89 MMHG | SYSTOLIC BLOOD PRESSURE: 139 MMHG | WEIGHT: 145 LBS | HEIGHT: 64 IN | HEART RATE: 74 BPM | BODY MASS INDEX: 24.75 KG/M2

## 2025-01-02 DIAGNOSIS — N40.1 ENLARGED PROSTATE WITH URINARY OBSTRUCTION: ICD-10-CM

## 2025-01-02 DIAGNOSIS — R97.20 ELEVATED PSA: Primary | ICD-10-CM

## 2025-01-02 DIAGNOSIS — N13.8 ENLARGED PROSTATE WITH URINARY OBSTRUCTION: ICD-10-CM

## 2025-01-02 PROCEDURE — 99999 PR PBB SHADOW E&M-EST. PATIENT-LVL III: CPT | Mod: PBBFAC,,, | Performed by: UROLOGY

## 2025-01-02 PROCEDURE — 99213 OFFICE O/P EST LOW 20 MIN: CPT | Mod: PBBFAC | Performed by: UROLOGY

## 2025-01-02 PROCEDURE — 99214 OFFICE O/P EST MOD 30 MIN: CPT | Mod: S$PBB,,, | Performed by: UROLOGY

## 2025-01-02 RX ORDER — FINASTERIDE 5 MG/1
5 TABLET, FILM COATED ORAL DAILY
Qty: 90 TABLET | Refills: 1 | Status: SHIPPED | OUTPATIENT
Start: 2025-01-02 | End: 2025-07-01

## 2025-01-02 RX ORDER — FINASTERIDE 5 MG/1
5 TABLET, FILM COATED ORAL DAILY
Qty: 30 TABLET | Refills: 5 | Status: SHIPPED | OUTPATIENT
Start: 2025-01-02 | End: 2025-01-02

## 2025-01-02 NOTE — PROGRESS NOTES
Chief Complaint:   Encounter Diagnoses   Name Primary?    Elevated PSA Yes    Enlarged prostate with urinary obstruction        HPI:  HPI Francisco Da Silva brennon 74 y.o. male who presents as a work-in today as he was having trouble getting into see his urologist Dr. Ruiz.  He has known history of BPH and was recently started on alfuzosin.  He states it works well at night but not during the day.  He has not been taking it with a meal.  He does have known history of enlarged prostate with the last MRI showing a 60 g gland.  He has had elevated PSA with negative biopsy in the past.    History:  Social History     Tobacco Use    Smoking status: Never    Smokeless tobacco: Never   Substance Use Topics    Alcohol use: Yes     Alcohol/week: 2.0 standard drinks of alcohol     Types: 2 Cans of beer per week    Drug use: No     Past Medical History:   Diagnosis Date    Asthma     SILVERIO (generalized anxiety disorder)     Hemorrhoids     HLD (hyperlipidemia)     HTN (hypertension)     Hypothyroid     Osteoarthritis     Thyroid nodule      Past Surgical History:   Procedure Laterality Date    APPENDECTOMY      CARPAL TUNNEL RELEASE      NASAL SINUS SURGERY       Family History   Problem Relation Name Age of Onset    Heart disease Mother      Stroke Father      Breast cancer Sister      Heart disease Brother         Current Outpatient Medications on File Prior to Visit   Medication Sig Dispense Refill    alfuzosin (UROXATRAL) 10 mg Tb24 Take 1 tablet (10 mg total) by mouth every evening. 90 tablet 1    aspirin (ECOTRIN) 81 MG EC tablet Take 81 mg by mouth once daily.      cholecalciferol, vitamin D3, (VITAMIN D3) 250 mcg (10,000 unit) Cap capsule Take 1 capsule (10,000 Units total) by mouth once a week. 12 capsule 1    ciclopirox (PENLAC) 8 % Soln Apply topically nightly. 3 each 1    diltiaZEM (TIAZAC) 240 MG Cs24 TAKE 1 CAPSULE DAILY 90 capsule 1    fluticasone propionate (FLONASE) 50 mcg/actuation nasal spray 2 sprays (100 mcg  "total) by Each Nostril route Daily. 16 g 2    niacin (NIASPAN) 1000 MG CR tablet Take 1 tablet (1,000 mg total) by mouth every evening. 90 tablet 3    rosuvastatin (CRESTOR) 10 MG tablet TAKE 1 TABLET EVERY EVENING 90 tablet 3    vardenafiL (LEVITRA) 20 MG tablet Take 1 tablet (20 mg total) by mouth As instructed for Erectile Dysfunction (Take 1 by mouth as needed 1 hour prior to sexual activity on an empty stomach). 6 tablet 6     No current facility-administered medications on file prior to visit.        Objective:     Vitals:    01/02/25 1530   BP: 139/89   BP Location: Right arm   Patient Position: Sitting   Pulse: 74   Weight: 65.8 kg (145 lb)   Height: 5' 4" (1.626 m)      BMI Readings from Last 1 Encounters:   01/02/25 24.89 kg/m²          Physical Exam  No acute distress alert and oriented   Respirations even unlabored   Abdomen is soft nontender    Lab Results   Component Value Date    PSAFREEPCT 20.00 10/31/2024    PSAFREEPCT 36.90 05/02/2024    PSALABCORP 4.7 (H) 02/01/2023    PSALABCORP 5.6 (H) 11/14/2022    PSALABCORP 3.8 03/07/2022        Lab Results   Component Value Date    CREATININE 0.80 10/28/2024      Assessment:       1. Elevated PSA    2. Enlarged prostate with urinary obstruction        Plan:     1. Elevated PSA    2. Enlarged prostate with urinary obstruction       Orders Placed This Encounter    finasteride (PROSCAR) 5 mg tablet      Bladder scan today was minimal.  Patient is unable to give us a urine sample.  His main complaints were hesitancy and having to straining to void.  I recommend taking alfuzosin with a meal daily at breakfast.  I will also we will add finasteride.  He is scheduled for repeat PSA and follow up in May with Dr. Ruiz.  He understands if he does not see improvement in the next couple of weeks he is to notify me.  "

## 2025-03-04 PROBLEM — J45.40 MODERATE PERSISTENT ASTHMA WITHOUT COMPLICATION: Status: ACTIVE | Noted: 2024-01-30

## 2025-03-04 PROBLEM — I25.118 ATHEROSCLEROTIC HEART DISEASE OF NATIVE CORONARY ARTERY WITH OTHER FORMS OF ANGINA PECTORIS: Status: ACTIVE | Noted: 2025-03-04

## 2025-04-11 ENCOUNTER — HOSPITAL ENCOUNTER (OUTPATIENT)
Dept: RADIOLOGY | Facility: HOSPITAL | Age: 75
Discharge: HOME | End: 2025-04-11
Attending: INTERNAL MEDICINE
Payer: MEDICARE

## 2025-04-11 DIAGNOSIS — R93.89 ABNORMAL CHEST X-RAY: ICD-10-CM

## 2025-04-11 PROCEDURE — 25500020 PHARM REV CODE 255: Mod: PN | Performed by: INTERNAL MEDICINE

## 2025-04-11 PROCEDURE — 71260 CT THORAX DX C+: CPT | Mod: TC,PN

## 2025-04-11 PROCEDURE — 71260 CT THORAX DX C+: CPT | Mod: 26,,, | Performed by: RADIOLOGY

## 2025-04-11 RX ADMIN — IOHEXOL 75 ML: 350 INJECTION, SOLUTION INTRAVENOUS at 09:04

## 2025-04-21 PROBLEM — E05.90 HYPERTHYROIDISM: Status: ACTIVE | Noted: 2025-04-21

## 2025-04-21 PROBLEM — J92.9 PLEURAL PLAQUE: Status: ACTIVE | Noted: 2025-04-21

## 2025-04-28 ENCOUNTER — LAB VISIT (OUTPATIENT)
Dept: LAB | Facility: HOSPITAL | Age: 75
End: 2025-04-28
Attending: UROLOGY
Payer: MEDICARE

## 2025-04-28 DIAGNOSIS — R97.20 ELEVATED PSA: ICD-10-CM

## 2025-04-28 PROCEDURE — 36415 COLL VENOUS BLD VENIPUNCTURE: CPT | Mod: PO

## 2025-04-28 PROCEDURE — 84153 ASSAY OF PSA TOTAL: CPT

## 2025-04-29 ENCOUNTER — RESULTS FOLLOW-UP (OUTPATIENT)
Dept: UROLOGY | Facility: HOSPITAL | Age: 75
End: 2025-04-29

## 2025-04-29 ENCOUNTER — TELEPHONE (OUTPATIENT)
Dept: UROLOGY | Facility: CLINIC | Age: 75
End: 2025-04-29
Payer: MEDICARE

## 2025-04-29 LAB
PSA FREE MFR SERPL: 15.9 %
PSA FREE SERPL-MCNC: 0.8 NG/ML
PSA SERPL-MCNC: 5.03 NG/ML

## 2025-04-29 NOTE — TELEPHONE ENCOUNTER
Spoke with the patient regarding his elevated PSA level. I also confirmed his appt date, time and location. The patient verbalized understanding and had no further questions.   EVON Motley LPN    ----- Message from Peter Ruiz MD sent at 4/29/2025 12:48 PM CDT -----  Please call Mr Da Silva and let him know that I reviewed his PSA result and his PSA is elevated at 5.03 and it has mildly increased from his previous PSA of 4.6 in October 2024.  Make sure he knows the   details of his follow up appointment and offer to move up his appointment to an earlier date if there is an opening sooner.  Ask him if he has any questions.  ----- Message -----  From: Lab, Background User  Sent: 4/29/2025   1:05 AM CDT  To: Peter Ruiz MD

## 2025-05-15 ENCOUNTER — OFFICE VISIT (OUTPATIENT)
Dept: UROLOGY | Facility: CLINIC | Age: 75
End: 2025-05-15
Payer: MEDICARE

## 2025-05-15 VITALS
DIASTOLIC BLOOD PRESSURE: 70 MMHG | SYSTOLIC BLOOD PRESSURE: 143 MMHG | TEMPERATURE: 98 F | HEART RATE: 59 BPM | HEIGHT: 64 IN | WEIGHT: 147.06 LBS | BODY MASS INDEX: 25.11 KG/M2 | RESPIRATION RATE: 18 BRPM

## 2025-05-15 DIAGNOSIS — R53.83 FATIGUE, UNSPECIFIED TYPE: ICD-10-CM

## 2025-05-15 DIAGNOSIS — E29.1 MALE HYPOGONADISM: ICD-10-CM

## 2025-05-15 DIAGNOSIS — N40.1 BENIGN LOCALIZED PROSTATIC HYPERPLASIA WITH LOWER URINARY TRACT SYMPTOMS (LUTS): ICD-10-CM

## 2025-05-15 DIAGNOSIS — N32.81 OVERACTIVE BLADDER: ICD-10-CM

## 2025-05-15 DIAGNOSIS — R35.1 NOCTURIA: ICD-10-CM

## 2025-05-15 DIAGNOSIS — R97.20 ELEVATED PSA: Primary | ICD-10-CM

## 2025-05-15 LAB
BILIRUBIN, UA POC OHS: NEGATIVE
BLOOD, UA POC OHS: NEGATIVE
CLARITY, UA POC OHS: CLEAR
COLOR, UA POC OHS: YELLOW
GLUCOSE, UA POC OHS: NEGATIVE
KETONES, UA POC OHS: NEGATIVE
LEUKOCYTES, UA POC OHS: NEGATIVE
NITRITE, UA POC OHS: NEGATIVE
PH, UA POC OHS: 7.5
PROTEIN, UA POC OHS: NEGATIVE
SPECIFIC GRAVITY, UA POC OHS: 1.01
UROBILINOGEN, UA POC OHS: 0.2

## 2025-05-15 PROCEDURE — 99999 PR PBB SHADOW E&M-EST. PATIENT-LVL IV: CPT | Mod: PBBFAC,,, | Performed by: UROLOGY

## 2025-05-15 PROCEDURE — 81003 URINALYSIS AUTO W/O SCOPE: CPT | Mod: PBBFAC,PN | Performed by: UROLOGY

## 2025-05-15 PROCEDURE — 99214 OFFICE O/P EST MOD 30 MIN: CPT | Mod: PBBFAC,PN | Performed by: UROLOGY

## 2025-05-15 PROCEDURE — 99999PBSHW POCT URINALYSIS(INSTRUMENT): Mod: PBBFAC,,,

## 2025-05-15 PROCEDURE — 99215 OFFICE O/P EST HI 40 MIN: CPT | Mod: S$PBB,,, | Performed by: UROLOGY

## 2025-05-15 RX ORDER — FINASTERIDE 5 MG/1
5 TABLET, FILM COATED ORAL DAILY
Qty: 90 TABLET | Refills: 1 | Status: SHIPPED | OUTPATIENT
Start: 2025-05-15 | End: 2026-05-15

## 2025-05-15 RX ORDER — MEPOLIZUMAB 100 MG/ML
100 INJECTION, SOLUTION SUBCUTANEOUS
COMMUNITY
Start: 2025-04-30

## 2025-05-15 RX ORDER — FLUTICASONE PROPIONATE AND SALMETEROL 100; 50 UG/1; UG/1
1 POWDER RESPIRATORY (INHALATION) 2 TIMES DAILY
COMMUNITY

## 2025-05-15 NOTE — PROGRESS NOTES
Subjective:       Patient ID: Francisco Da Silva is a 75 y.o. male.    Chief Complaint: No chief complaint on file.      History of Present Illness:     Mr Da Silva had an elevated PSA.  His urine ExoDx was low at 4.38 on 6-10-23.  He underwent a prostate MRI on 4-24-23 that showed no focal suspicious lesion in the prostate gland.  PIRADS 2.  Prostate volume is 61.46 cc.  He has a history of a prostate biopsy by me on 6-7-18.  Path:  Benign prostate tissue.    He has BPH with LUTS.  He says that he stopped taking alfuzosin several months ago due to him having problems with fatigue on it.  He says his fatigue improved after he stopped taking the alfuzosin.  He says his urinary symptoms have worsened since being off of the alfuzosin.  He was on tamsulosin from 2-15-23 to early 2024.  Dr Valenzuela prescribed him finasteride 5 mg daily in January 2025 but he says he is not taking it.  Slow urinary stream.  Some feelings of incomplete bladder emptying.  Nocturia X 3-4.  He has daytime urinary frequency and urinary urgency.    Mr Da Silva has hypogonadism.   His recent labs showed a low free testosterone level.  He has not been on TRT since October 2020.  Testosterone 200 mg IM every 2 weeks was stopped in October 2020 due to his elevated PSA.  His testosterone was 158 on 7-10-20 after he was on 2 packets of androgel 1% daily for 8 weeks.  He is having problems with fatigue.  He has ED.  Tadalafil 20 mg did not work for his erections.   He had problems with headaches with Viagra in the past.  He does not remember taking the vardenafil 20 mg that I prescribed him in October 2024.         Past Medical History:   Diagnosis Date    Asthma     SILVERIO (generalized anxiety disorder)     Hemorrhoids     HLD (hyperlipidemia)     HTN (hypertension)     Hypothyroid     Osteoarthritis     Thyroid nodule      Family History   Problem Relation Name Age of Onset    Heart disease Mother      Stroke Father      Breast cancer Sister      Heart disease  "Brother       Social History[1]  Encounter Medications[2]     Review of Systems   Constitutional:  Negative for chills and fever.   Respiratory:  Negative for shortness of breath.    Cardiovascular:  Negative for chest pain.   Gastrointestinal:  Negative for nausea and vomiting.   Genitourinary:  Positive for frequency and urgency.   Musculoskeletal:  Negative for back pain.   Skin:  Negative for rash.   Neurological:  Negative for dizziness.   Psychiatric/Behavioral:  Negative for agitation.        Objective:     BP (!) 143/70 (BP Location: Left arm, Patient Position: Sitting)   Pulse (!) 59   Temp 97.9 °F (36.6 °C) (Oral)   Resp 18   Ht 5' 4" (1.626 m)   Wt 66.7 kg (147 lb 0.8 oz)   BMI 25.24 kg/m²     Physical Exam  Constitutional:       Appearance: Normal appearance.   Pulmonary:      Effort: Pulmonary effort is normal.   Abdominal:      Palpations: Abdomen is soft.   Neurological:      Mental Status: He is alert and oriented to person, place, and time.   Psychiatric:         Mood and Affect: Mood normal.         Office Visit on 05/15/2025   Component Date Value Ref Range Status    Color, POC UA 05/15/2025 Yellow  Yellow, Straw, Colorless Final    Clarity, POC UA 05/15/2025 Clear  Clear Final    Glucose, POC UA 05/15/2025 Negative  Negative Final    Bilirubin, POC UA 05/15/2025 Negative  Negative Final    Ketones, POC UA 05/15/2025 Negative  Negative Final    Spec Grav POC UA 05/15/2025 1.015  1.005 - 1.030 Final    Blood, POC UA 05/15/2025 Negative  Negative Final    pH, POC UA 05/15/2025 7.5  5.0 - 8.0 Final    Protein, POC UA 05/15/2025 Negative  Negative Final    Urobilinogen, POC UA 05/15/2025 0.2  <=1.0 Final    Nitrite, POC UA 05/15/2025 Negative  Negative Final    WBC, POC UA 05/15/2025 Negative  Negative Final        No results found for this or any previous visit (from the past 8760 hours).     Assessment:       1. Elevated PSA    2. Benign localized prostatic hyperplasia with lower urinary tract " symptoms (LUTS)    3. Nocturia    4. Overactive bladder    5. Male hypogonadism    6. Fatigue, unspecified type        Plan:     Orders Placed This Encounter    PSA, Total and Free    POCT Urinalysis(Instrument)    finasteride (PROSCAR) 5 mg tablet          4-24-23  Prostate MRI.  BRG.  See report.  No focal suspicious lesion in the prostate gland.  PIRADS 2.  Prostate volume is 61.46 cc.  No pelvic lymphadenopathy.     10-26-20  Prostate MRI.  BRG.  See report.  PIRADS 2.  Prostate size is 50.02 cc.     10-3-19  Prostate MRI.  BRG.  See report.  PIRADS 2.  Prostate size is 46.68.     I reviewed all of the above imaging results.             4-28-25  PSA 5.03.  Free % PSA 15.90.    10-31-24  PSA 4.6.  Free % PSA 20.00     5-2-24  PSA 5.8.  Free % PSA 36.90.     10-26-23  PSA 6.9.  Free % PSA 12.9.     5-17-23  PSA 6.24.  Free % PSA 15.5.     3-24-23  PSA 5.75.  Free % PSA 14.3     2-5-21  PSA 3.4     10-9-20  PSA 5.5.  Free % PSA 19.6.    3-4-25  Total testosterone 410.  Free testosterone 3.2.     5-2-24  Total testosterone 486     10-26-23  Total testosterone 258    4-19-23  Total testosterone 284.  Free testosterone 2.9.    3-4-25  Cr 0.63.  .    3-4-25  HgbA1c 5.3     6-10-23  Urine ExoDx.  4.38.     I reviewed all of the above lab results.            Assessment:  - Elevated PSA.  Urine ExoDx was low at 4.38 on 6-10-23.  - Prostate MRI on 4-24-23 showed no focal suspicious lesion in the prostate gland.  PIRADS 2.  Prostate volume is 61.46 cc.   - History of a prostate biopsy on 6-7-18.  Path:  Benign prostate tissue.  - BPH with LUTS.  He says that he stopped taking alfuzosin several months ago due to him having problems with fatigue on it.  He says his fatigue improved after he stopped taking the alfuzosin.  He says his urinary symptoms have worsened since being off of the alfuzosin.  He was on tamsulosin from 2-15-23 to early 2024.  Dr Valenzuela prescribed him finasteride 5 mg daily in January 2025 but he  says he is not taking it.  - Nocturia and OAB.  - Hypogonadism.  He has not been on TRT since October 2020.  Testosterone 200 mg IM every 2 weeks was stopped in October 2020 due to his elevated PSA.  His testosterone was 158 on 7-10-20 after he was on 2 packets of androgel 1% daily for 8 weeks.  - Fatigue.  - ED.  Tadalafil 20 mg did not work for his erections.   He had problems with headaches with Viagra in the past.  He does not remember taking the vardenafil 20 mg that I prescribed him in October 2024.     Plan:  - I discussed options with the patient today regarding his elevated PSA and the mutual decision was made to continue with observation with rechecking his PSA in 4 months.  - I had a long discussion with the patient today regarding his urinary symptoms and the mutual decision was made for him to start taking finasteride 5 mg daily.  I discussed the possible sexual side effects associated with finasteride such as ED and he expressed understanding.  - I prescribed him finasteride 5 mg 1 PO qdaily today on 5-15-25.  - He says that his PCP referred him to Dr Teague, Endocrinologist, for further discussion his hypogonadism.  - I discussed dietary modifications with him today and I recommended he drink mostly water during the day.  - I recommended he limit his fluid intake at night to small amounts of water and to void just prior to bedtime.   - PSA in 4 months a few days prior to a 4 month appointment.  - RTC in 4 months with a PVR on arrival or sooner as needed.                          [1]   Social History  Socioeconomic History    Marital status:    Tobacco Use    Smoking status: Never    Smokeless tobacco: Never   Substance and Sexual Activity    Alcohol use: Yes     Alcohol/week: 2.0 standard drinks of alcohol     Types: 2 Cans of beer per week    Drug use: No    Sexual activity: Yes     Partners: Female     Social Drivers of Health     Financial Resource Strain: Low Risk  (5/12/2025)    Overall  Financial Resource Strain (CARDIA)     Difficulty of Paying Living Expenses: Not hard at all   Food Insecurity: No Food Insecurity (5/12/2025)    Hunger Vital Sign     Worried About Running Out of Food in the Last Year: Never true     Ran Out of Food in the Last Year: Never true   Transportation Needs: No Transportation Needs (5/12/2025)    PRAPARE - Transportation     Lack of Transportation (Medical): No     Lack of Transportation (Non-Medical): No   Physical Activity: Insufficiently Active (5/12/2025)    Exercise Vital Sign     Days of Exercise per Week: 3 days     Minutes of Exercise per Session: 30 min   Stress: No Stress Concern Present (5/12/2025)    [employer] [place] [employer] - Occupational Stress Questionnaire     Feeling of Stress : Not at all   Housing Stability: Low Risk  (5/12/2025)    Housing Stability Vital Sign     Unable to Pay for Housing in the Last Year: No     Number of Times Moved in the Last Year: 0     Homeless in the Last Year: No   [2]   Outpatient Encounter Medications as of 5/15/2025   Medication Sig Dispense Refill    aspirin (ECOTRIN) 81 MG EC tablet Take 81 mg by mouth once daily.      budesonide (PULMICORT) 0.5 mg/2 mL nebulizer solution Take 0.5 mg by nebulization.      cholecalciferol, vitamin D3, 1,250 mcg (50,000 unit) capsule Take 1 capsule (50,000 Units total) by mouth once a week. 12 capsule 1    clopidogreL (PLAVIX) 75 mg tablet Take 75 mg by mouth once daily.      diltiaZEM (CARDIZEM CD) 180 MG 24 hr capsule Take 180 mg by mouth once daily.      EScitalopram oxalate (LEXAPRO) 5 MG Tab Take 1 tablet (5 mg total) by mouth once daily. 90 tablet 1    fluticasone propionate (CUTIVATE) 0.005 % ointment Apply topically 2 (two) times daily. 60 g 2    fluticasone propionate (FLONASE) 50 mcg/actuation nasal spray 2 sprays (100 mcg total) by Each Nostril route Daily. 16 g 2    hydrocodone-chlorpheniramine (TUSSIONEX) 10-8 mg/5 mL suspension Take 5 mLs by mouth every 12  (twelve) hours as needed for Cough. 115 mL 0    levoFLOXacin (LEVAQUIN) 500 MG tablet Take 1 tablet (500 mg total) by mouth once daily. 7 tablet 0    losartan (COZAAR) 50 MG tablet Take 50 mg by mouth once daily.      metoprolol succinate (TOPROL-XL) 50 MG 24 hr tablet Take 50 mg by mouth once daily.      montelukast (SINGULAIR) 10 mg tablet Take 1 tablet (10 mg total) by mouth every evening. 30 tablet 2    multivitamin (THERAGRAN) per tablet Take 1 tablet by mouth once daily.      niacin (NIASPAN) 1000 MG CR tablet TAKE 1 TABLET EVERY EVENING 90 tablet 3    NUCALA 100 mg/mL autoinjector Inject 100 mg into the skin every 30 days.      rosuvastatin (CRESTOR) 10 MG tablet Take 1 tablet (10 mg total) by mouth every evening. 90 tablet 1    finasteride (PROSCAR) 5 mg tablet Take 1 tablet (5 mg total) by mouth once daily. (Patient not taking: Reported on 5/15/2025) 90 tablet 1    finasteride (PROSCAR) 5 mg tablet Take 1 tablet (5 mg total) by mouth once daily. 90 tablet 1    fluticasone-salmeterol diskus inhaler 100-50 mcg Inhale 1 puff into the lungs 2 (two) times daily. Controller      TRELEGY ELLIPTA 200-62.5-25 mcg inhaler Inhale 1 puff into the lungs once daily. (Patient not taking: Reported on 5/15/2025)       No facility-administered encounter medications on file as of 5/15/2025.